# Patient Record
Sex: MALE | Race: WHITE | NOT HISPANIC OR LATINO | URBAN - METROPOLITAN AREA
[De-identification: names, ages, dates, MRNs, and addresses within clinical notes are randomized per-mention and may not be internally consistent; named-entity substitution may affect disease eponyms.]

---

## 2023-10-16 PROBLEM — M86.60 CHRONIC OSTEOMYELITIS (MULTI): Status: ACTIVE | Noted: 2023-10-16

## 2023-10-16 PROBLEM — A42.9 ACTINOMYCES INFECTION: Status: ACTIVE | Noted: 2023-10-16

## 2023-10-17 ENCOUNTER — TELEMEDICINE (OUTPATIENT)
Dept: INFECTIOUS DISEASES | Facility: CLINIC | Age: 23
End: 2023-10-17
Payer: COMMERCIAL

## 2023-10-17 DIAGNOSIS — A42.9 ACTINOMYCES INFECTION: Primary | ICD-10-CM

## 2023-10-17 PROCEDURE — 99214 OFFICE O/P EST MOD 30 MIN: CPT | Performed by: INTERNAL MEDICINE

## 2023-10-17 RX ORDER — AMOXICILLIN 500 MG/1
500 CAPSULE ORAL EVERY 8 HOURS SCHEDULED
COMMUNITY
Start: 2023-09-13 | End: 2024-02-15

## 2023-10-17 NOTE — PROGRESS NOTES
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  Imaging showed bone infection and operative cultures grew actinomycoses.  He received a long course of amoxicillin which he tolerated extremely well with resolution of symptoms.  He has had no recurrence of symptoms on the left side but call me in September with some swelling and discomfort on the right side that he said felt just like his prior left-sided actinomycosis.  At that time we initiated therapy with amoxicillin.  On the video visit today he indicated since taking amoxicillin for the last 4 weeks right-sided jaw is better there is still some tenderness and soreness but it is unequivocally improved.  Excellent tolerance of amoxicillin with no fever rash nausea vomiting diarrhea.    Video visit looks well      Concern for actinomycosis.  We will continue amoxicillin and do a follow-up visit in December.  If there is a failure of improvement or any worsening will do imaging.  He has her contact information if any issues come up in the interim.

## 2024-01-12 ENCOUNTER — TELEMEDICINE (OUTPATIENT)
Dept: INFECTIOUS DISEASES | Facility: CLINIC | Age: 24
End: 2024-01-12
Payer: COMMERCIAL

## 2024-01-12 DIAGNOSIS — A42.9 ACTINOMYCES INFECTION: Primary | ICD-10-CM

## 2024-01-12 PROCEDURE — 99213 OFFICE O/P EST LOW 20 MIN: CPT | Performed by: INTERNAL MEDICINE

## 2024-01-12 NOTE — PROGRESS NOTES
Update January 12, 2024  Patient reports excellent tolerance of amoxicillin with no side effects and improvement in the discomfort in the right side of his face.  Would be concerned this could be again actinomycoses.    Looks well on video.    Had a clear-cut case of actinomycosis in 2021 now with some concern for the same process in the right.  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  Imaging showed bone infection and operative cultures grew actinomycoses.  He received a long course of amoxicillin which he tolerated extremely well with resolution of symptoms.  He has had no recurrence of symptoms on the left side but call me in September with some swelling and discomfort on the right side that he said felt just like his prior left-sided actinomycosis.  At that time we initiated therapy with amoxicillin.  On the video visit today he indicated since taking amoxicillin for the last 4 weeks right-sided jaw is better there is still some tenderness and soreness but it is unequivocally improved.  Excellent tolerance of amoxicillin with no fever rash nausea vomiting diarrhea.    Video visit looks well      Concern for actinomycosis.  We will continue amoxicillin and do a follow-up visit in December.  If there is a failure of improvement or any worsening will do imaging.  He has her contact information if any issues come up in the interim.

## 2024-02-15 DIAGNOSIS — A42.9 ACTINOMYCES INFECTION: ICD-10-CM

## 2024-02-15 RX ORDER — AMOXICILLIN 500 MG/1
CAPSULE ORAL
Qty: 120 CAPSULE | Refills: 0 | Status: SHIPPED | OUTPATIENT
Start: 2024-02-15 | End: 2024-02-19

## 2024-02-17 DIAGNOSIS — A42.9 ACTINOMYCES INFECTION: ICD-10-CM

## 2024-02-19 RX ORDER — AMOXICILLIN 500 MG/1
CAPSULE ORAL
Qty: 120 CAPSULE | Refills: 0 | Status: SHIPPED | OUTPATIENT
Start: 2024-02-19 | End: 2024-03-14

## 2024-03-13 DIAGNOSIS — A42.9 ACTINOMYCES INFECTION: ICD-10-CM

## 2024-03-14 RX ORDER — AMOXICILLIN 500 MG/1
CAPSULE ORAL
Qty: 120 CAPSULE | Refills: 0 | Status: SHIPPED | OUTPATIENT
Start: 2024-03-14 | End: 2024-04-29

## 2024-04-29 DIAGNOSIS — A42.9 ACTINOMYCES INFECTION: ICD-10-CM

## 2024-04-29 RX ORDER — AMOXICILLIN 500 MG/1
CAPSULE ORAL
Qty: 120 CAPSULE | Refills: 0 | Status: SHIPPED | OUTPATIENT
Start: 2024-04-29 | End: 2024-06-03

## 2024-05-31 DIAGNOSIS — A42.9 ACTINOMYCES INFECTION: ICD-10-CM

## 2024-06-03 DIAGNOSIS — A42.9 ACTINOMYCES INFECTION: ICD-10-CM

## 2024-06-03 RX ORDER — AMOXICILLIN 500 MG/1
CAPSULE ORAL
Qty: 120 CAPSULE | Refills: 0 | Status: SHIPPED | OUTPATIENT
Start: 2024-06-03 | End: 2024-06-04

## 2024-06-04 RX ORDER — AMOXICILLIN 500 MG/1
CAPSULE ORAL
Qty: 120 CAPSULE | Refills: 0 | Status: SHIPPED | OUTPATIENT
Start: 2024-06-04

## 2024-06-07 ENCOUNTER — TELEMEDICINE (OUTPATIENT)
Dept: INFECTIOUS DISEASES | Facility: CLINIC | Age: 24
End: 2024-06-07
Payer: COMMERCIAL

## 2024-06-07 DIAGNOSIS — A42.9 ACTINOMYCES INFECTION: Primary | ICD-10-CM

## 2024-06-07 DIAGNOSIS — M86.60 CHRONIC OSTEOMYELITIS (MULTI): ICD-10-CM

## 2024-06-07 PROCEDURE — 99214 OFFICE O/P EST MOD 30 MIN: CPT | Performed by: INTERNAL MEDICINE

## 2024-06-07 NOTE — PROGRESS NOTES
Update June 7, 2024  Doing well.  Excellent tolerance of amoxicillin.  Jaw symptoms related to concern for actinomycoses have resolved.  No nausea vomiting rash fever chills diarrhea.  Looks well on video.    On amoxicillin for concern for actinomycoses jaw infection since October 2023.  I told Matt we typically treat for a year for Actemra.  He is having 0 side effects of amoxicillin and feels great.  He will continue amoxicillin and will do another visit in early October virtual    Update January 12, 2024  Patient reports excellent tolerance of amoxicillin with no side effects and improvement in the discomfort in the right side of his face.  Would be concerned this could be again actinomycoses.    Looks well on video.    Had a clear-cut case of actinomycosis in 2021 now with some concern for the same process in the right.  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  Imaging showed bone infection and operative cultures grew actinomycoses.  He received a long course of amoxicillin which he tolerated extremely well with resolution of symptoms.  He has had no recurrence of symptoms on the left side but call me in September with some swelling and discomfort on the right side that he said felt just like his prior left-sided actinomycosis.  At that time we initiated therapy with amoxicillin.  On the video visit today he indicated since taking amoxicillin for the last 4 weeks right-sided jaw is better there is still some tenderness and soreness but it is unequivocally improved.  Excellent tolerance of amoxicillin with no fever rash nausea vomiting diarrhea.    Video visit looks well      Concern for actinomycosis.  We will continue amoxicillin and do a follow-up visit in December.  If there  is a failure of improvement or any worsening will do imaging.  He has her contact information if any issues come up in the interim.

## 2024-08-13 DIAGNOSIS — A42.9 ACTINOMYCES INFECTION: ICD-10-CM

## 2024-08-14 RX ORDER — AMOXICILLIN 500 MG/1
CAPSULE ORAL
Qty: 120 CAPSULE | Refills: 0 | Status: SHIPPED | OUTPATIENT
Start: 2024-08-14

## 2024-09-17 DIAGNOSIS — A42.9 ACTINOMYCES INFECTION: ICD-10-CM

## 2024-09-17 RX ORDER — AMOXICILLIN 500 MG/1
CAPSULE ORAL
Qty: 120 CAPSULE | Refills: 0 | Status: SHIPPED | OUTPATIENT
Start: 2024-09-17

## 2024-10-01 ENCOUNTER — APPOINTMENT (OUTPATIENT)
Dept: INFECTIOUS DISEASES | Facility: CLINIC | Age: 24
End: 2024-10-01
Payer: COMMERCIAL

## 2024-10-04 ENCOUNTER — APPOINTMENT (OUTPATIENT)
Dept: INFECTIOUS DISEASES | Facility: CLINIC | Age: 24
End: 2024-10-04
Payer: COMMERCIAL

## 2024-10-25 DIAGNOSIS — A42.9 ACTINOMYCES INFECTION: ICD-10-CM

## 2024-10-25 RX ORDER — AMOXICILLIN 500 MG/1
CAPSULE ORAL
Qty: 120 CAPSULE | Refills: 0 | Status: SHIPPED | OUTPATIENT
Start: 2024-10-25

## 2025-01-07 DIAGNOSIS — A42.9 ACTINOMYCES INFECTION: ICD-10-CM

## 2025-01-07 RX ORDER — AMOXICILLIN 500 MG/1
CAPSULE ORAL
Qty: 120 CAPSULE | Refills: 0 | Status: SHIPPED | OUTPATIENT
Start: 2025-01-07

## 2025-02-15 DIAGNOSIS — A42.9 ACTINOMYCES INFECTION: ICD-10-CM

## 2025-02-17 RX ORDER — AMOXICILLIN 500 MG/1
CAPSULE ORAL
Qty: 120 CAPSULE | Refills: 0 | Status: SHIPPED | OUTPATIENT
Start: 2025-02-17 | End: 2025-02-19

## 2025-02-19 DIAGNOSIS — A42.9 ACTINOMYCES INFECTION: ICD-10-CM

## 2025-02-19 RX ORDER — AMOXICILLIN 500 MG/1
CAPSULE ORAL
Qty: 120 CAPSULE | Refills: 0 | Status: SHIPPED | OUTPATIENT
Start: 2025-02-19

## 2025-05-02 DIAGNOSIS — A42.9 ACTINOMYCES INFECTION: ICD-10-CM

## 2025-05-05 RX ORDER — AMOXICILLIN 500 MG/1
CAPSULE ORAL
Qty: 120 CAPSULE | Refills: 0 | Status: SHIPPED | OUTPATIENT
Start: 2025-05-05

## 2025-05-06 DIAGNOSIS — A42.9 ACTINOMYCES INFECTION: ICD-10-CM

## 2025-05-06 RX ORDER — AMOXICILLIN 500 MG/1
CAPSULE ORAL
Qty: 120 CAPSULE | Refills: 0 | OUTPATIENT
Start: 2025-05-06

## 2025-06-11 DIAGNOSIS — A42.9 ACTINOMYCES INFECTION: ICD-10-CM

## 2025-06-12 RX ORDER — AMOXICILLIN 500 MG/1
CAPSULE ORAL
Qty: 120 CAPSULE | Refills: 0 | OUTPATIENT
Start: 2025-06-12

## 2025-06-13 ENCOUNTER — TELEPHONE (OUTPATIENT)
Dept: INFECTIOUS DISEASES | Facility: HOSPITAL | Age: 25
End: 2025-06-13
Payer: COMMERCIAL

## 2025-06-13 DIAGNOSIS — A42.9 ACTINOMYCES INFECTION: ICD-10-CM

## 2025-06-13 RX ORDER — AMOXICILLIN 500 MG/1
500 CAPSULE ORAL EVERY 8 HOURS SCHEDULED
Qty: 180 CAPSULE | Refills: 3 | Status: SHIPPED | OUTPATIENT
Start: 2025-06-13 | End: 2025-09-11

## 2025-06-13 RX ORDER — AMOXICILLIN 500 MG/1
CAPSULE ORAL
Qty: 120 CAPSULE | Refills: 0 | Status: SHIPPED | OUTPATIENT
Start: 2025-06-13

## 2025-06-13 NOTE — TELEPHONE ENCOUNTER
"\"I see. Unfortuately I had back to back to back meetings but just sent it!\" - Dr. Urias    Called patient and notified  "

## 2025-06-13 NOTE — TELEPHONE ENCOUNTER
Hello, [facility] in [place] had called because the patient was requesting a refill on their amoxicillin. They provided me with their callback # [phone], and their fax # [phone] - Me 2:28p    I will refill in a bit- but he should be scheduled for a virtual visit in the next couple of months because it has been more than a year - Dr. Urias 2:36p